# Patient Record
Sex: FEMALE | Race: WHITE | Employment: UNEMPLOYED | URBAN - METROPOLITAN AREA
[De-identification: names, ages, dates, MRNs, and addresses within clinical notes are randomized per-mention and may not be internally consistent; named-entity substitution may affect disease eponyms.]

---

## 2020-03-11 ENCOUNTER — HOSPITAL ENCOUNTER (OUTPATIENT)
Dept: ULTRASOUND IMAGING | Age: 60
Discharge: HOME | End: 2020-03-11
Payer: COMMERCIAL

## 2020-03-11 PROCEDURE — 76705 ECHO EXAM OF ABDOMEN: CPT

## 2022-05-23 LAB
BILIRUBIN URINE: NEGATIVE
BLOOD, URINE: NEGATIVE
CLARITY: CLEAR
COLOR: YELLOW
CULTURE: NORMAL
CULTURE: NORMAL
GLUCOSE URINE: NEGATIVE
KETONES, URINE: NEGATIVE
LEUKOCYTE ESTERASE, URINE: NEGATIVE
MICROSCOPIC EXAMINATION: NORMAL
MICROSCOPIC EXAMINATION: NORMAL
NITRITE, URINE: NEGATIVE
PH UA: 6 (ref 5–7.5)
PROTEIN UA: NEGATIVE
SPECIFIC GRAVITY UA: 1.01 (ref 1–1.03)
UROBILINOGEN, URINE: 0.2 MG/DL (ref 0.2–1)

## 2022-08-26 LAB
A/G RATIO: 1.8 (ref 1.2–2.2)
ALBUMIN SERPL-MCNC: 4.4 G/DL (ref 3.8–4.8)
ALP BLD-CCNC: 72 IU/L (ref 44–121)
ALT SERPL-CCNC: 29 IU/L (ref 0–32)
AST SERPL-CCNC: 22 IU/L (ref 0–40)
BASOPHILS ABSOLUTE: 0.1 X10E3/UL (ref 0–0.2)
BASOPHILS RELATIVE PERCENT: 1 %
BILIRUB SERPL-MCNC: <0.2 MG/DL (ref 0–1.2)
BUN / CREAT RATIO: 23 (ref 12–28)
BUN BLDV-MCNC: 22 MG/DL (ref 8–27)
CALCIUM SERPL-MCNC: 9.6 MG/DL (ref 8.7–10.3)
CHLORIDE BLD-SCNC: 107 MMOL/L (ref 96–106)
CHOLESTEROL, TOTAL: 267 MG/DL (ref 100–199)
CO2: 22 MMOL/L (ref 20–29)
CREAT SERPL-MCNC: 0.94 MG/DL (ref 0.57–1)
EGFR (CKD-EPI): 69 ML/MIN/1.73
EOSINOPHILS ABSOLUTE: 0.1 X10E3/UL (ref 0–0.4)
EOSINOPHILS RELATIVE PERCENT: 2 %
GLOBULIN: 2.5 G/DL (ref 1.5–4.5)
GLUCOSE BLD-MCNC: 93 MG/DL (ref 65–99)
HBA1C MFR BLD: 5.6 % (ref 4.8–5.6)
HCT VFR BLD CALC: 41.3 % (ref 34–46.6)
HDLC SERPL-MCNC: 59 MG/DL
HEMOGLOBIN: 13.3 G/DL (ref 11.1–15.9)
IMMATURE GRANS (ABS): 0 X10E3/UL (ref 0–0.1)
IMMATURE GRANULOCYTES: 1 %
LDL CHOLESTEROL CALCULATED: 172 MG/DL (ref 0–99)
LYMPHOCYTES ABSOLUTE: 1.6 X10E3/UL (ref 0.7–3.1)
LYMPHOCYTES RELATIVE PERCENT: 28 %
MCH RBC QN AUTO: 29.5 PG (ref 26.6–33)
MCHC RBC AUTO-ENTMCNC: 32.2 G/DL (ref 31.5–35.7)
MCV RBC AUTO: 92 FL (ref 79–97)
MONOCYTES ABSOLUTE: 0.4 X10E3/UL (ref 0.1–0.9)
MONOCYTES RELATIVE PERCENT: 8 %
NEUTROPHILS ABSOLUTE: 3.5 X10E3/UL (ref 1.4–7)
PDW BLD-RTO: 13 % (ref 11.7–15.4)
PLATELET # BLD: 357 X10E3/UL (ref 150–450)
POTASSIUM SERPL-SCNC: 4.3 MMOL/L (ref 3.5–5.2)
RBC # BLD: 4.51 X10E6/UL (ref 3.77–5.28)
SEGMENTED NEUTROPHILS RELATIVE PERCENT: 60 %
SODIUM BLD-SCNC: 143 MMOL/L (ref 134–144)
TOTAL PROTEIN: 6.9 G/DL (ref 6–8.5)
TRIGL SERPL-MCNC: 196 MG/DL (ref 0–149)
VLDLC SERPL CALC-MCNC: 36 MG/DL (ref 5–40)
WBC # BLD: 5.8 X10E3/UL (ref 3.4–10.8)

## 2023-06-01 ENCOUNTER — NURSE ONLY (OUTPATIENT)
Dept: CARDIOLOGY CLINIC | Age: 63
End: 2023-06-01

## 2023-06-01 ENCOUNTER — OFFICE VISIT (OUTPATIENT)
Dept: CARDIOLOGY CLINIC | Age: 63
End: 2023-06-01
Payer: COMMERCIAL

## 2023-06-01 VITALS
WEIGHT: 173.8 LBS | HEIGHT: 63 IN | SYSTOLIC BLOOD PRESSURE: 110 MMHG | DIASTOLIC BLOOD PRESSURE: 60 MMHG | HEART RATE: 80 BPM | BODY MASS INDEX: 30.79 KG/M2

## 2023-06-01 DIAGNOSIS — G47.33 OSA ON CPAP: ICD-10-CM

## 2023-06-01 DIAGNOSIS — R07.2 PRECORDIAL PAIN: ICD-10-CM

## 2023-06-01 DIAGNOSIS — Z99.89 OSA ON CPAP: ICD-10-CM

## 2023-06-01 DIAGNOSIS — R42 DIZZINESS: ICD-10-CM

## 2023-06-01 DIAGNOSIS — R06.02 SOB (SHORTNESS OF BREATH): ICD-10-CM

## 2023-06-01 DIAGNOSIS — R00.2 PALPITATION: Primary | ICD-10-CM

## 2023-06-01 PROBLEM — I10 PRIMARY HYPERTENSION: Status: ACTIVE | Noted: 2023-06-01

## 2023-06-01 PROBLEM — I10 PRIMARY HYPERTENSION: Status: RESOLVED | Noted: 2023-06-01 | Resolved: 2023-06-01

## 2023-06-01 PROCEDURE — 99204 OFFICE O/P NEW MOD 45 MIN: CPT | Performed by: INTERNAL MEDICINE

## 2023-06-01 PROCEDURE — 93000 ELECTROCARDIOGRAM COMPLETE: CPT | Performed by: INTERNAL MEDICINE

## 2023-06-01 RX ORDER — FLUTICASONE FUROATE, UMECLIDINIUM BROMIDE AND VILANTEROL TRIFENATATE 200; 62.5; 25 UG/1; UG/1; UG/1
1 POWDER RESPIRATORY (INHALATION) DAILY
COMMUNITY
Start: 2022-06-08

## 2023-06-01 RX ORDER — LOTEPREDNOL ETABONATE 5 MG/ML
SUSPENSION/ DROPS OPHTHALMIC
COMMUNITY
Start: 2023-03-30

## 2023-06-01 RX ORDER — FUROSEMIDE 20 MG/1
1 TABLET ORAL DAILY
COMMUNITY
Start: 2018-07-05

## 2023-06-01 RX ORDER — LORAZEPAM 0.5 MG/1
TABLET ORAL
COMMUNITY

## 2023-06-01 RX ORDER — CYCLOSPORINE 0.5 MG/ML
EMULSION OPHTHALMIC
COMMUNITY
Start: 2022-07-12

## 2023-06-01 RX ORDER — SERTRALINE HYDROCHLORIDE 25 MG/1
TABLET, FILM COATED ORAL
COMMUNITY

## 2023-06-01 RX ORDER — MEPOLIZUMAB 100 MG/ML
INJECTION, SOLUTION SUBCUTANEOUS
COMMUNITY
Start: 2023-05-30

## 2023-06-01 RX ORDER — PREGABALIN 100 MG/1
100 CAPSULE ORAL 2 TIMES DAILY
COMMUNITY

## 2023-06-01 RX ORDER — METOPROLOL SUCCINATE 25 MG/1
25 TABLET, EXTENDED RELEASE ORAL EVERY 12 HOURS
COMMUNITY
End: 2023-06-01

## 2023-06-01 RX ORDER — PREDNISONE 10 MG/1
TABLET ORAL
COMMUNITY
Start: 2023-05-11

## 2023-06-01 RX ORDER — OXYBUTYNIN CHLORIDE 5 MG/1
1 TABLET ORAL 2 TIMES DAILY
COMMUNITY
Start: 2022-06-08

## 2023-06-01 RX ORDER — HYDROCODONE BITARTRATE AND ACETAMINOPHEN 5; 325 MG/1; MG/1
TABLET ORAL
COMMUNITY

## 2023-06-01 RX ORDER — AMOXICILLIN AND CLAVULANATE POTASSIUM 875; 125 MG/1; MG/1
TABLET, FILM COATED ORAL
COMMUNITY

## 2023-06-01 RX ORDER — PRAMIPEXOLE DIHYDROCHLORIDE 0.25 MG/1
0.25 TABLET ORAL 3 TIMES DAILY
COMMUNITY

## 2023-06-01 RX ORDER — TRAMADOL HYDROCHLORIDE 50 MG/1
50 TABLET ORAL EVERY 6 HOURS PRN
COMMUNITY
Start: 2023-05-25

## 2023-06-01 RX ORDER — KETOROLAC TROMETHAMINE 30 MG/ML
1 INJECTION, SOLUTION INTRAMUSCULAR; INTRAVENOUS
COMMUNITY

## 2023-06-01 RX ORDER — TOPIRAMATE 50 MG/1
100 TABLET, FILM COATED ORAL 2 TIMES DAILY
COMMUNITY
Start: 2022-09-19

## 2023-06-01 RX ORDER — PANTOPRAZOLE SODIUM 40 MG/1
1 TABLET, DELAYED RELEASE ORAL 2 TIMES DAILY
COMMUNITY
Start: 2022-06-08

## 2023-06-01 NOTE — PATIENT INSTRUCTIONS
Palpitations / Chest pain: with associated, SOB, chest pain & dizziness. No significant cardiac risk factors but strong family H/O premature CAD ( Father had MI in 45s & Brother in his 47 s. No H/O Syncope. Thyroid status is unknown, will check TSH levels. Will check 72 hour Holter. Will also check Stress Cardiolite & Echo for risk stratification. BERNARDINO: uses C-Pap    I spent about 30 min. of time in review of the available data, chart Prep., interviewing patient, obtaining history, performing physical exam, going through decision making analysis for assessment & plans of management on this patient. Office Visit for test results.

## 2023-06-01 NOTE — PROGRESS NOTES
3 days e-cardio monitor placed.  # [id number]. Instructed patient on how to record the event and to call monitoring center at [phone] if any problems arise. Instructed patient to disconnect the lead wires from the electrodes before bathing or showering and reattach them afterwards. Instructed patient that the electrodes should be changed every 3 days or if they no longer adhere to the skin. Patient to mail package after the monitor has ended. Patient verbalized understanding.

## 2023-06-01 NOTE — PROGRESS NOTES
CARDIAC CONSULT NOTE       Alyx Rojas  60 y.o.  female    Chief Complaint   Patient presents with    Shortness of Breath    Dizziness    Palpitations    New Patient       Referring physician:  Padmini Segura DO     Primary care physician:  Padmini Segura DO    History of Present Illness:     Alyx Rojas is a 60 y.o. female referred for evaluation and management of Palpitations. Palpitations:  When did they begin:About a month ago. How often do they occur: daily, several times. How long do they last: about 10 minutes. Aggravating factors: stress but mostly physical activity. Relieving factors: Resting, Caramel tea. Associated features: SOB, Dizziness & chest pain + excessive fatigue. Thyroid Status:unknown. Caffeine intake:one or 2 cups of Coffee, no Soda & chocolate.       has a past medical history of Asthma & Dyslipidemia. has no past surgical history on file. reports that she has never smoked. She has never used smokeless tobacco. She reports that she does not currently use alcohol. She reports that she does not use drugs. family history is not on file. Review of Systems:   Cardiovascular: No chest pain, dyspnea on exertion, palpitations or loss of consciousness  Respiratory: No cough or wheezing    Musculoskeletal:  No gait disturbance, weakness, muscle cramps, aches & pains or joint complaints  Neurological: No TIA or stroke symptoms  Psychiatric: No anxiety or depression  Hematologic/Lymphatic: No bleeding problems, blood clots or swollen lymph nodes    Physical Examination:    /60 (Site: Left Upper Arm, Position: Standing, Cuff Size: Medium Adult)   Pulse 80   Ht 5' 3\" (1.6 m)   Wt 173 lb 12.8 oz (78.8 kg)   BMI 30.79 kg/m²    Wt Readings from Last 3 Encounters:   06/01/23 173 lb 12.8 oz (78.8 kg)     Body mass index is 30.79 kg/m². General Appearance:  obese/Well Nourished  1. Skin: It is warm & dry. No rashes noted.   2. Eyes: No

## 2023-06-01 NOTE — PROGRESS NOTES
Vein \"LEG PROBLEM Questionnaire\"  Do you have prominent leg veins? Yes   Do you have any skin discoloration? No  Do you have any healed or active sores? No  Do you have swelling of the legs? Yes  Do you have a family history of varicose veins? 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 Yes  7. Have you been fighting overweight problems? No  8. Do you have restless legs? Yes  9. Do you have any night time cramps? Yes  10. Do you have any of the following in your legs:        Aching, Itching and Tiredness weakness  11. If Yes - Have they worn compression stockings No

## 2023-06-07 LAB
A/G RATIO: 1.8 (ref 1.2–2.2)
ALBUMIN SERPL-MCNC: 3.9 G/DL (ref 3.8–4.8)
ALP BLD-CCNC: 50 IU/L (ref 44–121)
ALT SERPL-CCNC: 30 IU/L (ref 0–32)
AST SERPL-CCNC: 22 IU/L (ref 0–40)
BASOPHILS ABSOLUTE: 0 X10E3/UL (ref 0–0.2)
BASOPHILS RELATIVE PERCENT: 1 %
BILIRUB SERPL-MCNC: 0.2 MG/DL (ref 0–1.2)
BUN / CREAT RATIO: 16 (ref 12–28)
BUN BLDV-MCNC: 16 MG/DL (ref 8–27)
CALCIUM SERPL-MCNC: 9.5 MG/DL (ref 8.7–10.3)
CHLORIDE BLD-SCNC: 112 MMOL/L (ref 96–106)
CHOLESTEROL, TOTAL: 227 MG/DL (ref 100–199)
CO2: 21 MMOL/L (ref 20–29)
CREAT SERPL-MCNC: 0.99 MG/DL (ref 0.57–1)
EOSINOPHILS ABSOLUTE: 0 X10E3/UL (ref 0–0.4)
EOSINOPHILS RELATIVE PERCENT: 1 %
ESTIMATED GLOMERULAR FILTRATION RATE CREATININE EQUATION: 64 ML/MIN/1.73
GLOBULIN: 2.2 G/DL (ref 1.5–4.5)
GLUCOSE BLD-MCNC: 92 MG/DL (ref 70–99)
HBA1C MFR BLD: 5.5 % (ref 4.8–5.6)
HCT VFR BLD CALC: 37.1 % (ref 34–46.6)
HDLC SERPL-MCNC: 53 MG/DL
HEMOGLOBIN: 12.6 G/DL (ref 11.1–15.9)
IMMATURE GRANS (ABS): 0 X10E3/UL (ref 0–0.1)
IMMATURE GRANULOCYTES: 0 %
LDL CHOLESTEROL CALCULATED: 144 MG/DL (ref 0–99)
LYMPHOCYTES ABSOLUTE: 1.2 X10E3/UL (ref 0.7–3.1)
LYMPHOCYTES RELATIVE PERCENT: 28 %
MCH RBC QN AUTO: 31 PG (ref 26.6–33)
MCHC RBC AUTO-ENTMCNC: 34 G/DL (ref 31.5–35.7)
MCV RBC AUTO: 91 FL (ref 79–97)
MONOCYTES ABSOLUTE: 0.5 X10E3/UL (ref 0.1–0.9)
MONOCYTES RELATIVE PERCENT: 11 %
NEUTROPHILS ABSOLUTE: 2.5 X10E3/UL (ref 1.4–7)
PDW BLD-RTO: 12.8 % (ref 11.7–15.4)
PLATELET # BLD: 274 X10E3/UL (ref 150–450)
POTASSIUM SERPL-SCNC: 4.4 MMOL/L (ref 3.5–5.2)
RBC # BLD: 4.06 X10E6/UL (ref 3.77–5.28)
SEGMENTED NEUTROPHILS RELATIVE PERCENT: 59 %
SODIUM BLD-SCNC: 146 MMOL/L (ref 134–144)
TOTAL PROTEIN: 6.1 G/DL (ref 6–8.5)
TRIGL SERPL-MCNC: 168 MG/DL (ref 0–149)
VLDLC SERPL CALC-MCNC: 30 MG/DL (ref 5–40)
WBC # BLD: 4.2 X10E3/UL (ref 3.4–10.8)

## 2023-06-19 ENCOUNTER — TELEPHONE (OUTPATIENT)
Dept: CARDIOLOGY CLINIC | Age: 63
End: 2023-06-19

## 2023-06-19 NOTE — TELEPHONE ENCOUNTER
Called patient regarding holter monitor. Patient stated shipped monitor back to [employer] via [employer] on 6/5/2023.

## 2023-06-20 ENCOUNTER — TELEPHONE (OUTPATIENT)
Dept: CARDIOLOGY CLINIC | Age: 63
End: 2023-06-20

## 2023-06-20 NOTE — TELEPHONE ENCOUNTER
Received a call from Brianne from [address] [place] [address] letting me know that there was only 8 hours of data on patient holter monitor. Patient was supposed to wear monitor for 72 hours. I cancelled the study and called patient. She said that the monitor kept falling off and she had to keep charging the monitor. Patient is willing to have the monitor reapplied but said that she has to call the office back as she is closing on her house today.

## 2023-07-03 ENCOUNTER — NURSE ONLY (OUTPATIENT)
Dept: CARDIOLOGY CLINIC | Age: 63
End: 2023-07-03

## 2023-07-03 DIAGNOSIS — R00.2 PALPITATION: Primary | ICD-10-CM

## 2023-07-03 NOTE — PROGRESS NOTES
Applied @ 1030, 72hr holter w/monitor# [id number] for Dx of Palpitations. Educated pt on proper holter usage; how to keep sx diary; & when to bring monitor back to office. Pt voiced understanding. Holter order,including monitor & card#, & time started, to front nurse's station in 's in-box. Registered and Installed by Ilda Cuevas.

## 2023-07-11 ENCOUNTER — TELEPHONE (OUTPATIENT)
Dept: CARDIOLOGY CLINIC | Age: 63
End: 2023-07-11

## 2023-07-25 ENCOUNTER — NURSE ONLY (OUTPATIENT)
Dept: CARDIOLOGY CLINIC | Age: 63
End: 2023-07-25

## 2023-07-25 ENCOUNTER — OFFICE VISIT (OUTPATIENT)
Dept: CARDIOLOGY CLINIC | Age: 63
End: 2023-07-25
Payer: COMMERCIAL

## 2023-07-25 VITALS
HEART RATE: 68 BPM | WEIGHT: 172.2 LBS | HEIGHT: 63 IN | BODY MASS INDEX: 30.51 KG/M2 | DIASTOLIC BLOOD PRESSURE: 70 MMHG | SYSTOLIC BLOOD PRESSURE: 128 MMHG

## 2023-07-25 DIAGNOSIS — G47.33 OSA ON CPAP: ICD-10-CM

## 2023-07-25 DIAGNOSIS — R00.2 PALPITATION: Primary | ICD-10-CM

## 2023-07-25 DIAGNOSIS — R07.2 PRECORDIAL PAIN: ICD-10-CM

## 2023-07-25 DIAGNOSIS — Z99.89 OSA ON CPAP: ICD-10-CM

## 2023-07-25 DIAGNOSIS — R06.02 SOB (SHORTNESS OF BREATH): ICD-10-CM

## 2023-07-25 DIAGNOSIS — R42 DIZZINESS: ICD-10-CM

## 2023-07-25 PROCEDURE — 99213 OFFICE O/P EST LOW 20 MIN: CPT | Performed by: INTERNAL MEDICINE

## 2023-07-25 NOTE — PROGRESS NOTES
72 hour holter monitor applied 07/25/2023 @ 1629 for palpitations Serial # [id number] . Instructed patient on monitor and proper use. Instructed on diary. When to remove and bring it back. Must leave the holter monitor on  without removing for the duration of time ordered. Answered all questions the patient had. [phone] with any questions or concerns with the monitor. Patient voiced and demonstrated understanding.    Installed by El Rueda LPN

## 2023-07-25 NOTE — PROGRESS NOTES
One unsuccessful attempt in Left AC with butterfly needle for TSH  One successful attempt in Left accessory cephalic with butterfly needle for TSH
in to the note with dates. Labs, specially in Reference to Lipid profile, Cardiac testing in the form of Echo ( dated: ), stress tests ( dated: ) & other relevant cardiac testing reviewed with patient & recommendations made based on assessment of the results. Discussed role of Cardiac risk factors & effects + treatment of co morbidities with patient & advised accordingly. MEDICATIONS: List of medications patient is currently taking is reviewed in detail with the patient. Discussed any side effects or problems taking the medication. Recommend Continue present management & medications as listed. AFFIRMATION: I spent at least 20 minutes of time reviewing patient's history, previous & current medical problems & all Labs + testing. This includes chart prep even prior to the vosit. Various goals are discussed and multiple questions answered. Relevant concelling performed. Office follow up for event monitor results.

## 2023-07-25 NOTE — PATIENT INSTRUCTIONS
Palpitations : with associated, SOB & dizziness. No significant cardiac risk factors but strong family H/O premature CAD ( Father had MI in 45s & Brother in his 47 s. No H/O Syncope. Thyroid status is Still unknown,  TSH levels were drawn at [facility]. They are not available to me yet. Stress Cardiolite 6/12/2023   Normal study. Fair exercise tolerance. 7 METs work load. Mildly hypertensive BP response to exercise. ETT negative for Ischemia / Arrhythmia. Normal perfusion study with normal distribution in all coronal, short, and   horizontal axis. Normal LV function. LVEF is > 70 %    ECHO 6/12/2023   Normal left ventricle structure and systolic function with an ejection   fraction of 55-60%. No evidence of diastolic dysfunction. Essentially normal chamber sizes. No significant valvular disease noted. Normal pulmonary artery pressure. No evidence of pericardial effusion. Somehow monitors reportedly did not work. Therefore W/U is incomplete. BERNARDINO: uses C-Pap    TESTS ORDERED: Event Monitor     PREVIOUSLY ORDERED TESTS REVIEWED & DISCUSSED WITH THE PATIENT:     I personally reviewed & interpreted, all previously ordered tests as copied above. Latest Labs are pulled in to the note with dates. Labs, specially in Reference to Lipid profile, Cardiac testing in the form of Echo ( dated: ), stress tests ( dated: ) & other relevant cardiac testing reviewed with patient & recommendations made based on assessment of the results. Discussed role of Cardiac risk factors & effects + treatment of co morbidities with patient & advised accordingly. MEDICATIONS: List of medications patient is currently taking is reviewed in detail with the patient. Discussed any side effects or problems taking the medication. Recommend Continue present management & medications as listed.      AFFIRMATION: I spent at least

## 2023-07-27 DIAGNOSIS — R06.02 SOB (SHORTNESS OF BREATH): ICD-10-CM

## 2023-07-27 LAB — TSH SERPL DL<=0.005 MIU/L-ACNC: 1.78 UIU/ML (ref 0.27–4.2)

## 2023-08-25 ENCOUNTER — OFFICE VISIT (OUTPATIENT)
Dept: CARDIOLOGY CLINIC | Age: 63
End: 2023-08-25
Payer: COMMERCIAL

## 2023-08-25 VITALS
BODY MASS INDEX: 29.55 KG/M2 | SYSTOLIC BLOOD PRESSURE: 118 MMHG | DIASTOLIC BLOOD PRESSURE: 64 MMHG | WEIGHT: 166.8 LBS | HEIGHT: 63 IN | HEART RATE: 86 BPM

## 2023-08-25 DIAGNOSIS — R60.0 BILATERAL LEG EDEMA: ICD-10-CM

## 2023-08-25 DIAGNOSIS — Z99.89 OSA ON CPAP: ICD-10-CM

## 2023-08-25 DIAGNOSIS — R00.2 PALPITATION: Primary | ICD-10-CM

## 2023-08-25 DIAGNOSIS — G47.33 OSA ON CPAP: ICD-10-CM

## 2023-08-25 PROCEDURE — 99213 OFFICE O/P EST LOW 20 MIN: CPT | Performed by: INTERNAL MEDICINE

## 2023-08-25 NOTE — PROGRESS NOTES
6.1 06/06/2023 08:54 AM    PROT 6.9 08/25/2022 09:23 AM     No results found for: TSH, TSHHS    QUALITY MEASURES REVIEWED:  1.CAD:Patient is taking anti platelet agent:No  Patient does not have Hx of documented CAD  2. DYSLIPIDEMIA: Patient is on cholesterol lowering medication:No   3. Beta-Blocker therapy for CAD, if prior Myocardial Infarction:No   4. Counselled regarding smoking cessation. No   Patient does not Smoke. 5.Anticoagulation therapy (for A.Fib) No   Does Not have A.Fib.  6.Discussed weight management strategies. Assessment & Plan:  Primary / Secondary prevention is the goal by aggressive risk modification, healthy and therapeutic life style changes for cardiovascular risk reduction. Palpitations / Chest pain: with associated, SOB, chest pain & dizziness. Symptoms have improved                        No significant cardiac risk factors but strong family H/O premature CAD ( Father had MI in 45s & Brother in his 47 s. No H/O Syncope. Thyroid status is Normal.  6/12/2023 Cardiolite   Normal study. Fair exercise tolerance. 7 METs work load. Mildly hypertensive BP response to exercise. ETT negative for Ischemia / Arrhythmia. Normal perfusion study with normal distribution in all coronal, short, and   horizontal axis. Normal LV function. LVEF is > 70 %    6/12/2023 Echo   Normal left ventricle structure and systolic function with an ejection   fraction of 55-60%. No evidence of diastolic dysfunction. Essentially normal chamber sizes. No significant valvular disease noted. Normal pulmonary artery pressure. No evidence of pericardial effusion. 8/15/2023 Monitor  Baseline rhythm is normal sinus. Physiological variation of HR noted. No clinically significant arrhythmias seen.                         BERNARDINO: uses C-Pap    TESTS ORDERED:none this visit     PREVIOUSLY ORDERED TESTS REVIEWED & DISCUSSED WITH THE PATIENT:     I personally reviewed

## 2023-08-25 NOTE — PATIENT INSTRUCTIONS
Palpitations / Chest pain: with associated, SOB, chest pain & dizziness. Symptoms have improved                        No significant cardiac risk factors but strong family H/O premature CAD ( Father had MI in 45s & Brother in his 47 s. No H/O Syncope. Thyroid status is Normal.  6/12/2023 Cardiolite   Normal study. Fair exercise tolerance. 7 METs work load. Mildly hypertensive BP response to exercise. ETT negative for Ischemia / Arrhythmia. Normal perfusion study with normal distribution in all coronal, short, and   horizontal axis. Normal LV function. LVEF is > 70 %    6/12/2023 Echo   Normal left ventricle structure and systolic function with an ejection   fraction of 55-60%. No evidence of diastolic dysfunction. Essentially normal chamber sizes. No significant valvular disease noted. Normal pulmonary artery pressure. No evidence of pericardial effusion. 8/15/2023 Monitor  Baseline rhythm is normal sinus. Physiological variation of HR noted. No clinically significant arrhythmias seen. BERNARDINO: uses C-Pap    TESTS ORDERED:none this visit     PREVIOUSLY ORDERED TESTS REVIEWED & DISCUSSED WITH THE PATIENT:     I personally reviewed & interpreted, all previously ordered tests as copied above. Latest Labs are pulled in to the note with dates. Labs, specially in Reference to Lipid profile, Cardiac testing in the form of Echo ( dated: ), stress tests ( dated: ) & other relevant cardiac testing reviewed with patient & recommendations made based on assessment of the results. Discussed role of Cardiac risk factors & effects + treatment of co morbidities with patient & advised accordingly. MEDICATIONS: List of medications patient is currently taking is reviewed in detail with the patient. Discussed any side effects or problems taking the medication. Recommend Continue present management & medications as listed.

## 2024-08-30 ENCOUNTER — OFFICE VISIT (OUTPATIENT)
Dept: CARDIOLOGY CLINIC | Age: 64
End: 2024-08-30
Payer: COMMERCIAL

## 2024-08-30 VITALS
DIASTOLIC BLOOD PRESSURE: 70 MMHG | HEART RATE: 80 BPM | BODY MASS INDEX: 30.65 KG/M2 | SYSTOLIC BLOOD PRESSURE: 120 MMHG | WEIGHT: 173 LBS | HEIGHT: 63 IN

## 2024-08-30 DIAGNOSIS — G47.33 OSA ON CPAP: ICD-10-CM

## 2024-08-30 DIAGNOSIS — R42 DIZZINESS: ICD-10-CM

## 2024-08-30 DIAGNOSIS — R07.2 PRECORDIAL PAIN: Primary | ICD-10-CM

## 2024-08-30 DIAGNOSIS — R06.02 SOB (SHORTNESS OF BREATH): ICD-10-CM

## 2024-08-30 DIAGNOSIS — R60.0 BILATERAL LEG EDEMA: ICD-10-CM

## 2024-08-30 DIAGNOSIS — R00.2 PALPITATION: ICD-10-CM

## 2024-08-30 PROCEDURE — 99213 OFFICE O/P EST LOW 20 MIN: CPT | Performed by: INTERNAL MEDICINE

## 2024-08-30 PROCEDURE — 93000 ELECTROCARDIOGRAM COMPLETE: CPT | Performed by: INTERNAL MEDICINE

## 2024-08-30 RX ORDER — HYOSCYAMINE SULFATE 0.125 MG
125 TABLET,DISINTEGRATING ORAL EVERY 4 HOURS PRN
COMMUNITY

## 2024-08-30 RX ORDER — ESTRADIOL 1 MG/1
1 TABLET ORAL DAILY
COMMUNITY

## 2024-08-30 RX ORDER — LORATADINE 10 MG/1
10 CAPSULE, LIQUID FILLED ORAL DAILY
COMMUNITY

## 2024-08-30 RX ORDER — CYCLOBENZAPRINE HCL 10 MG
10 TABLET ORAL 3 TIMES DAILY PRN
COMMUNITY

## 2024-08-30 RX ORDER — SUMATRIPTAN 100 MG/1
100 TABLET, FILM COATED ORAL
COMMUNITY

## 2024-08-30 NOTE — PROGRESS NOTES
OFFICE PROGRESS NOTES      Sharon is a 63 y.o. female who has    CHIEF COMPLAINT AS FOLLOWS:  CHEST PAIN:  Patient  C/O chest pains about 6 weeks ago.  It is dull in nature about 5 out of 10 in intensity radiates to under her left arm.  Thanks it happens due to stress.  And she gets short of breath along with the chest pain.                           SOB:  C/O SOB due to her asthma, she think.                 LEG EDEMA: B/L Lower extremity edema is present but no change over previous.   PALPITATIONS:  C/O Palpitations  says as before                               DIZZINESS:  C/O feeling Dizziness when she talks lasting few seconds.   SYNCOPE: None   OTHER/ ADDITIONAL COMPLAINTS:                                     HPI: Patient is here for F/U on her Palpitations & Dyslipidemia problems.     Dyslipidemia: Patient has known mixed dyslipidemia. Has been treated with guideline recommended medical / physical/ diet therapy as stated below.                Current Outpatient Medications   Medication Sig Dispense Refill    EPINEPHrine (AUVI-Q IJ) Inject as directed      cyclobenzaprine (FLEXERIL) 10 MG tablet Take 1 tablet by mouth 3 times daily as needed for Muscle spasms      estradiol (ESTRACE) 1 MG tablet Take 1 tablet by mouth daily      hyoscyamine (OSCIMIN) 0.125 MG TBDP dispersible tablet Take 1 tablet by mouth every 4 hours as needed      loratadine (CLARITIN) 10 MG capsule Take 1 capsule by mouth daily      SUMAtriptan (IMITREX) 100 MG tablet Take 1 tablet by mouth once as needed for Migraine      pramipexole (MIRAPEX) 0.25 MG tablet Take 1 tablet by mouth 3 times daily      pregabalin (LYRICA) 100 MG capsule Take 1 capsule by mouth 2 times daily.      albuterol (PROVENTIL) (5 MG/ML) 0.5% nebulizer solution Inhale 0.5 mLs into the lungs every 6 hours as needed      topiramate (TOPAMAX) 50 MG tablet Take 2 tablets by mouth 2 times daily      cycloSPORINE (RESTASIS) 0.05 % ophthalmic emulsion INSTILL 1 DROP IN BOTH  all coronal, short, and   horizontal axis.   Normal LV function. LVEF is > 70 %     6/12/2023 Echo   Normal left ventricle structure and systolic function with an ejection   fraction of 55-60%.   No evidence of diastolic dysfunction.   Essentially normal chamber sizes.   No significant valvular disease noted.   Normal pulmonary artery pressure.   No evidence of pericardial effusion.     8/15/2023 Monitor  Baseline rhythm is normal sinus.  Physiological variation of HR noted.  No clinically significant arrhythmias seen.       EKG: NSR 80 / min, WNL                     BERNARDINO: uses C-Pap     TESTS ORDERED:none this visit     PREVIOUSLY ORDERED TESTS REVIEWED & DISCUSSED WITH THE PATIENT:     I personally reviewed & interpreted, all previously ordered tests as copied above. Latest Labs are pulled in to the note with dates.   Labs, specially in Reference to Lipid profile, Cardiac testing in the form of Echo ( dated: ), stress tests ( dated: ) & other relevant cardiac testing reviewed with patient & recommendations made based on assessment of the results.    Discussed role of Cardiac risk factors & effects + treatment of co morbidities with patient & advised accordingly.  Most likely patient's symptom complex is noncardiac in origin she had had complete cardiac testing not too long ago.  I suggest follow-up with family doctor     MEDICATIONS: List of medications patient is currently taking is reviewed in detail with the patient. Discussed any side effects or problems taking the medication.     Recommend Continue present management & medications as listed.     AFFIRMATION: I spent at least 20 minutes of time reviewing patient's history, previous & current medical problems & all Labs + testing. This includes chart prep even prior to the vosit. Various goals are discussed and multiple questions answered.Relevant concelling performed.     Office follow up in one year.

## 2024-08-30 NOTE — PATIENT INSTRUCTIONS
BUSINESS HOURS    Monday - Friday   8 am to 5 pm    [place]: [phone]    [place]: [phone]    [place]:  [phone]

## 2024-08-30 NOTE — PROGRESS NOTES
Vein \"LEG PROBLEM Questionnaire\"  Do you have prominent leg veins?  No   Do you have any skin discoloration?  No  Do you have any healed or active sores? No  Do you have swelling of the legs?  Yes  Do you have a family history of varicose veins? Yes  Does your profession involve pro-longed        standing or heavy lifting?    No  7.   Have you been fighting overweight problems? Yes  8.   Do you have restless legs?   Yes  9.   Do you have any night time cramps?  Yes  10. Do you have any of the following in your legs:        Aching I  11.If Yes - Have they worn compression stockings No  12. If they have worn compression stockings